# Patient Record
Sex: MALE | Race: WHITE | NOT HISPANIC OR LATINO | Employment: FULL TIME | URBAN - METROPOLITAN AREA
[De-identification: names, ages, dates, MRNs, and addresses within clinical notes are randomized per-mention and may not be internally consistent; named-entity substitution may affect disease eponyms.]

---

## 2017-08-01 ENCOUNTER — TELEPHONE (OUTPATIENT)
Dept: FAMILY MEDICINE CLINIC | Facility: CLINIC | Age: 42
End: 2017-08-01

## 2017-08-01 NOTE — TELEPHONE ENCOUNTER
"----- Message from Nikos Esparza sent at 8/1/2017 11:25 AM EDT -----  Contact: PT  PT IS WANTING TO BE SEEN BY LIO RODRIGUEZ. HE IS A NEW PATIENT INTO THE OFFICE BUT SAYS THAT LIO PERSONALLY KNOWS HIM AND HIS FAMILY. PT STATED THAT HE IS A  AND THAT HIS CHIEF COMPLAINT IS BACK PAIN AND NUMBNESS IN HIS FINGERS. PT IS CONCERNED BECAUSE THE NUMBNESS IS IN HIS \"WEAPON HAND\". I SCHEDULED THE PT FOR A NEW PT APPT WITH LIO NEXT WED. (8/9/2017) AT 9AM. DOES LIO KNOW WHO THIS IS, AND DOES SHE WANT ME TO TRY AND SQUEEZE HIM IN EARLIER? PT WAS WANTING TO BE SEEN TODAY, IF POSSIBLE.  "

## 2017-08-02 ENCOUNTER — OFFICE VISIT (OUTPATIENT)
Dept: FAMILY MEDICINE CLINIC | Facility: CLINIC | Age: 42
End: 2017-08-02

## 2017-08-02 VITALS
DIASTOLIC BLOOD PRESSURE: 86 MMHG | TEMPERATURE: 97.7 F | HEART RATE: 87 BPM | HEIGHT: 74 IN | OXYGEN SATURATION: 98 % | BODY MASS INDEX: 29.39 KG/M2 | WEIGHT: 229 LBS | RESPIRATION RATE: 16 BRPM | SYSTOLIC BLOOD PRESSURE: 136 MMHG

## 2017-08-02 DIAGNOSIS — M54.2 NECK PAIN: ICD-10-CM

## 2017-08-02 DIAGNOSIS — M54.12 CERVICAL RADICULOPATHY: ICD-10-CM

## 2017-08-02 DIAGNOSIS — M79.601 PAIN OF RIGHT UPPER EXTREMITY: Primary | ICD-10-CM

## 2017-08-02 PROCEDURE — 96372 THER/PROPH/DIAG INJ SC/IM: CPT | Performed by: PHYSICIAN ASSISTANT

## 2017-08-02 PROCEDURE — 99203 OFFICE O/P NEW LOW 30 MIN: CPT | Performed by: PHYSICIAN ASSISTANT

## 2017-08-02 RX ORDER — METHOCARBAMOL 500 MG/1
500 TABLET, FILM COATED ORAL 4 TIMES DAILY
Qty: 40 TABLET | Refills: 1 | Status: SHIPPED | OUTPATIENT
Start: 2017-08-02 | End: 2017-08-09 | Stop reason: ALTCHOICE

## 2017-08-02 RX ORDER — PREDNISONE 10 MG/1
10 TABLET ORAL DAILY
COMMUNITY
End: 2017-08-09

## 2017-08-02 NOTE — PROGRESS NOTES
Subjective   Sergei Griffith is a 42 y.o. male    History of Present Illness  Patient resents today as a new patient to our practice to establish care.  I've seen him in the past [place] [facility].  He states that he began having sudden severe pain in his right shoulder blade region on Saturday without any history of trauma.  He states that soon after this radiated down his entire right arm from shoulder to his hand.  He has been experiencing some weakness in his hand  as well as some numbness in his second third and fourth digits of his right hand.  He states the pain is excruciating and sharp.  He has gone his chiropractor couple times without any improvement.  He states he did an x-ray showing some straightening of the spine around the neck.  He was seen by his company physician at [facility] yesterday since he was unable to get into our office and was started on Medrol Dosepak.  The following portions of the patient's history were reviewed and updated as appropriate: allergies, current medications, past social history and problem list    Review of Systems   Constitutional: Positive for activity change.   HENT: Negative for sore throat, trouble swallowing and voice change.    Respiratory: Negative.  Negative for shortness of breath.    Gastrointestinal: Negative.    Genitourinary: Negative.    Musculoskeletal: Positive for arthralgias, back pain, myalgias, neck pain and neck stiffness. Negative for gait problem and joint swelling.   Skin: Negative.  Negative for rash and wound.   Neurological: Positive for weakness and numbness. Negative for dizziness and tremors.   Hematological: Negative for adenopathy.       Objective     Vitals:    08/02/17 1136   BP: 136/86   Pulse: 87   Resp: 16   Temp: 97.7 °F (36.5 °C)   SpO2: 98%       Physical Exam   Constitutional: He is oriented to person, place, and time. He appears well-developed and well-nourished. No distress.   HENT:   Head: Normocephalic and atraumatic.   Neck: No  JVD present. Spinous process tenderness and muscular tenderness present. Decreased range of motion present. No tracheal deviation present. No thyromegaly present.   Cardiovascular: Intact distal pulses.    Pulmonary/Chest: Effort normal and breath sounds normal. No stridor.   Musculoskeletal: He exhibits tenderness ( Patient is tenderness noted at right lateral cervical musculature into right trapezius.  Weakness noted in right upper extremity 3/5 strength versus 5 over 5 left upper extremity). He exhibits no edema or deformity.   Lymphadenopathy:     He has no cervical adenopathy.   Neurological: He is oriented to person, place, and time. He displays normal reflexes. No cranial nerve deficit. He exhibits normal muscle tone. Coordination normal.   Skin: Skin is warm and dry. No rash noted. He is not diaphoretic. No erythema. No pallor.   Nursing note and vitals reviewed.      Assessment/Plan     Diagnoses and all orders for this visit:    Pain of right upper extremity    Neck pain    Cervical radiculopathy    Other orders  -     predniSONE (DELTASONE) 10 MG tablet; Take 10 mg by mouth Daily.  -     methocarbamol (ROBAXIN) 500 MG tablet; Take 1 tablet by mouth 4 (Four) Times a Day. For pain in arm and neck as needed with muscle spasm  Depo-Medrol 40 mg IM given in office.  Advised patient continue on Medrol Dosepak.  Continue with chiropractic treatment if so desired, patient states he wants to continue doing this.  If pain persist after the weekend would recommend physical therapy consultation and/or MRI of C-spine.

## 2017-08-09 ENCOUNTER — TELEPHONE (OUTPATIENT)
Dept: FAMILY MEDICINE CLINIC | Facility: CLINIC | Age: 42
End: 2017-08-09

## 2017-08-09 RX ORDER — MELOXICAM 15 MG/1
15 TABLET ORAL DAILY
Qty: 30 TABLET | Refills: 0 | Status: SHIPPED | OUTPATIENT
Start: 2017-08-09 | End: 2019-07-16

## 2017-08-09 RX ORDER — METAXALONE 800 MG/1
800 TABLET ORAL 4 TIMES DAILY
Qty: 40 TABLET | Refills: 2 | Status: SHIPPED | OUTPATIENT
Start: 2017-08-09 | End: 2019-07-16

## 2017-08-09 NOTE — TELEPHONE ENCOUNTER
I have sent 2 new medications in for him, have him stop his methocarbamol.  Contact me when he wants the MRI scheduled.

## 2017-08-09 NOTE — TELEPHONE ENCOUNTER
Patient isn't feeling any better. [facility] and has seen chiropractor six times. He is interested in the MRI but will be at [facility] [facility] x3 weeks so will call back when done to have it set up. He wants to know if you can change/adjust meds to help with pain.    [phone]

## 2017-08-09 NOTE — TELEPHONE ENCOUNTER
----- Message from Valorie Lopez sent at 8/9/2017  8:21 AM EDT -----  Contact: PT.  PT. SAW LIO LAST Tuesday.  PT. WAS GIVEN A STEROID INJECTION & STILL NOT FEELING ANY BETTER & GIVEN 2 MEDS.:  METHOCARBARMIL, 500 MG., TAKEN 4/DAY & GABAPENTIN, 300 MG., TAKEN @ P.M.  THEY ARE NOT WORKING FOR THE PINCHED NERVE ISSUE; STILL IN SEVERE PAIN.  RX=[facility].  PT. CAN BE REACHED @ #: ABOVE HOME #.

## 2017-08-09 NOTE — TELEPHONE ENCOUNTER
Please call patient, see if he is seen any improvement.  I would recommend we get him into physical therapy also, see how he feels about that.  If pain has not improved at all would recommend an MRI of his back.

## 2017-08-18 RX ORDER — METHYLPREDNISOLONE ACETATE 40 MG/ML
40 INJECTION, SUSPENSION INTRA-ARTICULAR; INTRALESIONAL; INTRAMUSCULAR; SOFT TISSUE ONCE
Status: COMPLETED | OUTPATIENT
Start: 2017-08-18 | End: 2017-08-18

## 2017-08-18 RX ADMIN — METHYLPREDNISOLONE ACETATE 40 MG: 40 INJECTION, SUSPENSION INTRA-ARTICULAR; INTRALESIONAL; INTRAMUSCULAR; SOFT TISSUE at 15:31

## 2017-09-26 ENCOUNTER — TELEPHONE (OUTPATIENT)
Dept: FAMILY MEDICINE CLINIC | Facility: CLINIC | Age: 42
End: 2017-09-26

## 2017-09-26 RX ORDER — FLUTICASONE PROPIONATE 50 MCG
2 SPRAY, SUSPENSION (ML) NASAL DAILY
Qty: 1 BOTTLE | Refills: 11 | Status: SHIPPED | OUTPATIENT
Start: 2017-09-26 | End: 2023-03-28

## 2017-09-26 NOTE — TELEPHONE ENCOUNTER
----- Message from Sergei Griffith sent at 9/22/2017 12:39 PM EDT -----  Regarding: Prescription Question  Contact: [phone]  Is it possible to get a rx for flonase called in for my allergies. I had one from [facility] for my sinus infections to use but I just ran out. Thanks

## 2019-07-16 ENCOUNTER — OFFICE VISIT (OUTPATIENT)
Dept: FAMILY MEDICINE CLINIC | Facility: CLINIC | Age: 44
End: 2019-07-16

## 2019-07-16 VITALS
HEIGHT: 74 IN | TEMPERATURE: 97.7 F | WEIGHT: 240 LBS | DIASTOLIC BLOOD PRESSURE: 90 MMHG | BODY MASS INDEX: 30.8 KG/M2 | SYSTOLIC BLOOD PRESSURE: 146 MMHG | OXYGEN SATURATION: 98 % | HEART RATE: 80 BPM

## 2019-07-16 DIAGNOSIS — I10 ESSENTIAL HYPERTENSION: Primary | ICD-10-CM

## 2019-07-16 PROCEDURE — 99213 OFFICE O/P EST LOW 20 MIN: CPT | Performed by: PHYSICIAN ASSISTANT

## 2019-07-16 PROCEDURE — 93000 ELECTROCARDIOGRAM COMPLETE: CPT | Performed by: PHYSICIAN ASSISTANT

## 2019-07-16 RX ORDER — LISINOPRIL 10 MG/1
10 TABLET ORAL DAILY
Qty: 30 TABLET | Refills: 5 | Status: SHIPPED | OUTPATIENT
Start: 2019-07-16 | End: 2019-10-30 | Stop reason: SDUPTHER

## 2019-07-16 NOTE — PROGRESS NOTES
Subjective   Sergei Griffith is a 44 y.o. male  Elevated Blood Pressure (concerned about intermittent elevated blood pressure x2 months)      History of Present Illness  Patient comes in today concerned about her blood pressure staying elevated over the past several months.  His weight is up higher than he is ever been.  Both of his parents have history of high blood pressure.  He states he is noticed some flushing and pulsing in his neck at times and his pressure gets high.  He finds it hard to tolerate warm weather.  No swelling, no chest pain or shortness of breath no palpitations.  The following portions of the patient's history were reviewed and updated as appropriate: allergies, current medications, past social history and problem list    Review of Systems   Constitutional: Negative for fatigue and unexpected weight change.   Respiratory: Negative for cough, chest tightness and shortness of breath.    Cardiovascular: Negative for chest pain, palpitations and leg swelling.   Gastrointestinal: Negative for nausea.   Skin: Negative for color change and rash.   Neurological: Negative for dizziness, syncope, weakness and headaches.       Objective     Vitals:    07/16/19 0947   BP: 146/90   Pulse: 80   Temp: 97.7 °F (36.5 °C)   SpO2: 98%         Physical Exam   Constitutional: He appears well-developed and well-nourished. No distress.   Neck: No JVD present.   Cardiovascular: Normal rate, regular rhythm, normal heart sounds and intact distal pulses.   No murmur heard.  Pulmonary/Chest: Effort normal and breath sounds normal. No respiratory distress. He exhibits no tenderness.   Abdominal: Soft. He exhibits no distension. There is no tenderness.   Musculoskeletal: He exhibits no edema.   Skin: Skin is warm and dry. He is not diaphoretic. No erythema. No pallor.   Psychiatric: He has a normal mood and affect.   Nursing note and vitals reviewed.      ECG 12 Lead  Date/Time: 7/16/2019 10:46 AM  Performed by: Blues  Mavis DIXON PA-C  Authorized by: Mavis Sandy PA-C   Comparison: not compared with previous ECG   Previous ECG: no previous ECG available  Rhythm: sinus bradycardia  Rate: bradycardic  BPM: 55  ST Segments: ST segments normal  T Waves: T waves normal  QRS axis: normal  Other: no other findings    Clinical impression: normal ECG          Assessment/Plan     Diagnoses and all orders for this visit:    Essential hypertension  -     ECG 12 Lead    Other orders  -     lisinopril (PRINIVIL,ZESTRIL) 10 MG tablet; Take 1 tablet by mouth Daily. For BP    Start on lisinopril as noted above for hypertension uncontrolled new diagnosis, follow-up in 1 month for recheck of blood pressure.

## 2019-08-21 ENCOUNTER — TELEPHONE (OUTPATIENT)
Dept: FAMILY MEDICINE CLINIC | Facility: CLINIC | Age: 44
End: 2019-08-21

## 2019-08-21 NOTE — TELEPHONE ENCOUNTER
Regarding: Prescription Question  Contact: [phone]  ----- Message from Mychart, Generic sent at 8/21/2019  7:56 AM EDT -----    Is it possible to increase the dosage on my bp medication? I've cut out soft drinks and cut back on greasy foods. I've lost about 12 pounds but I still feel bad at times. I'm due for a refill in a couple days and wanted to see if this is possible before I refill. Thanks

## 2019-08-22 NOTE — TELEPHONE ENCOUNTER
Please find out what patient's blood pressure readings are currently to see if he needs to have his blood pressure medication raised.

## 2019-08-22 NOTE — TELEPHONE ENCOUNTER
I do not understand.  I cannot see any other message.  I see what he wrote about wanting to increase the blood pressure medication but it does not say anything at all about what his blood pressure is running.

## 2019-10-30 ENCOUNTER — OFFICE VISIT (OUTPATIENT)
Dept: FAMILY MEDICINE CLINIC | Facility: CLINIC | Age: 44
End: 2019-10-30

## 2019-10-30 ENCOUNTER — LAB (OUTPATIENT)
Dept: LAB | Facility: HOSPITAL | Age: 44
End: 2019-10-30

## 2019-10-30 VITALS
HEIGHT: 74 IN | DIASTOLIC BLOOD PRESSURE: 82 MMHG | OXYGEN SATURATION: 99 % | HEART RATE: 74 BPM | WEIGHT: 234 LBS | TEMPERATURE: 98.2 F | SYSTOLIC BLOOD PRESSURE: 140 MMHG | BODY MASS INDEX: 30.03 KG/M2

## 2019-10-30 DIAGNOSIS — I10 ESSENTIAL HYPERTENSION: Primary | ICD-10-CM

## 2019-10-30 DIAGNOSIS — I10 ESSENTIAL HYPERTENSION: ICD-10-CM

## 2019-10-30 LAB
ANION GAP SERPL CALCULATED.3IONS-SCNC: 14 MMOL/L (ref 5–15)
BUN BLD-MCNC: 11 MG/DL (ref 6–20)
BUN/CREAT SERPL: 7.9 (ref 7–25)
CALCIUM SPEC-SCNC: 9.7 MG/DL (ref 8.6–10.5)
CHLORIDE SERPL-SCNC: 100 MMOL/L (ref 98–107)
CHOLEST SERPL-MCNC: 164 MG/DL (ref 0–200)
CO2 SERPL-SCNC: 26 MMOL/L (ref 22–29)
CREAT BLD-MCNC: 1.4 MG/DL (ref 0.76–1.27)
GFR SERPL CREATININE-BSD FRML MDRD: 55 ML/MIN/1.73
GLUCOSE BLD-MCNC: 86 MG/DL (ref 65–99)
HDLC SERPL-MCNC: 30 MG/DL (ref 40–60)
LDLC SERPL CALC-MCNC: 106 MG/DL (ref 0–100)
LDLC/HDLC SERPL: 3.55 {RATIO}
POTASSIUM BLD-SCNC: 4.8 MMOL/L (ref 3.5–5.2)
SODIUM BLD-SCNC: 140 MMOL/L (ref 136–145)
TRIGL SERPL-MCNC: 138 MG/DL (ref 0–150)
VLDLC SERPL-MCNC: 27.6 MG/DL (ref 5–40)

## 2019-10-30 PROCEDURE — 36415 COLL VENOUS BLD VENIPUNCTURE: CPT

## 2019-10-30 PROCEDURE — 99213 OFFICE O/P EST LOW 20 MIN: CPT | Performed by: PHYSICIAN ASSISTANT

## 2019-10-30 PROCEDURE — 80061 LIPID PANEL: CPT | Performed by: PHYSICIAN ASSISTANT

## 2019-10-30 PROCEDURE — 80048 BASIC METABOLIC PNL TOTAL CA: CPT

## 2019-10-30 RX ORDER — LISINOPRIL 10 MG/1
10 TABLET ORAL DAILY
Qty: 90 TABLET | Refills: 1 | Status: SHIPPED | OUTPATIENT
Start: 2019-10-30 | End: 2020-11-20

## 2019-10-30 NOTE — PROGRESS NOTES
I have reviewed the notes, assessments, and/or procedures performed by Mavis Sandy PA-C, I concur with her documentation of Sergei Griffith.

## 2019-10-30 NOTE — PROGRESS NOTES
Subjective   Sergei Griffith is a 44 y.o. male  Hypertension (Follow up on blood pressure, doing well on medication )      History of Present Illness  Patient comes in today for six-month follow-up of hypertension.  States is feeling better, weight is down, he is improved his diet.  Blood pressure is well controlled.  Denies any adverse effects or medication.  Allergies well-controlled on Flonase OTC and allergy shots twice a week.  He is due for labs but is fasting today.  The following portions of the patient's history were reviewed and updated as appropriate: allergies, current medications, past social history and problem list    Review of Systems   Constitutional: Negative for fatigue and unexpected weight change.   Respiratory: Negative for cough, chest tightness and shortness of breath.    Cardiovascular: Negative for chest pain, palpitations and leg swelling.   Gastrointestinal: Negative for nausea.   Skin: Negative for color change and rash.   Neurological: Negative for dizziness, syncope, weakness and headaches.       Objective     Vitals:    10/30/19 1144   BP: 140/82   Pulse: 74   Temp: 98.2 °F (36.8 °C)   SpO2: 99%       Physical Exam   Constitutional: He appears well-developed and well-nourished. No distress.   Neck: No JVD present.   Cardiovascular: Normal rate, regular rhythm, normal heart sounds and intact distal pulses.   No murmur heard.  Pulmonary/Chest: Effort normal and breath sounds normal. No respiratory distress. He exhibits no tenderness.   Abdominal: Soft. He exhibits no distension. There is no tenderness.   Musculoskeletal: He exhibits no edema.   Skin: Skin is warm and dry. He is not diaphoretic. No erythema. No pallor.   Psychiatric: He has a normal mood and affect.   Nursing note and vitals reviewed.      Assessment/Plan     Diagnoses and all orders for this visit:    Essential hypertension  -     Basic metabolic panel; Future  -     Lipid Panel    Other orders  -     lisinopril  (PRINIVIL,ZESTRIL) 10 MG tablet; Take 1 tablet by mouth Daily. For BP    Follow-up in 6 months and as needed

## 2020-11-20 RX ORDER — LISINOPRIL 10 MG/1
10 TABLET ORAL DAILY
Qty: 30 TABLET | Refills: 0 | Status: SHIPPED | OUTPATIENT
Start: 2020-11-20 | End: 2022-04-11 | Stop reason: SDUPTHER

## 2021-03-11 NOTE — TELEPHONE ENCOUNTER
I would be glad to see this patient in my next standard opening it does not have to be a new patient slot.  I will be glad to see him tomorrow or I would be glad to work him in as a double book today if that is best for him. 08-Mar-2021

## 2022-04-11 ENCOUNTER — LAB (OUTPATIENT)
Dept: LAB | Facility: HOSPITAL | Age: 47
End: 2022-04-11

## 2022-04-11 ENCOUNTER — OFFICE VISIT (OUTPATIENT)
Dept: FAMILY MEDICINE CLINIC | Facility: CLINIC | Age: 47
End: 2022-04-11

## 2022-04-11 VITALS
DIASTOLIC BLOOD PRESSURE: 74 MMHG | SYSTOLIC BLOOD PRESSURE: 134 MMHG | OXYGEN SATURATION: 99 % | TEMPERATURE: 97.2 F | HEIGHT: 74 IN | HEART RATE: 72 BPM | WEIGHT: 239 LBS | BODY MASS INDEX: 30.67 KG/M2

## 2022-04-11 DIAGNOSIS — Z00.00 GENERAL MEDICAL EXAM: Primary | ICD-10-CM

## 2022-04-11 DIAGNOSIS — Z00.00 GENERAL MEDICAL EXAM: ICD-10-CM

## 2022-04-11 DIAGNOSIS — Z87.828 HISTORY OF BACK STRAIN: ICD-10-CM

## 2022-04-11 DIAGNOSIS — Z12.5 PROSTATE CANCER SCREENING: ICD-10-CM

## 2022-04-11 DIAGNOSIS — Z11.59 ENCOUNTER FOR HEPATITIS C SCREENING TEST FOR LOW RISK PATIENT: ICD-10-CM

## 2022-04-11 DIAGNOSIS — Z11.1 SCREENING-PULMONARY TB: ICD-10-CM

## 2022-04-11 DIAGNOSIS — I10 ESSENTIAL HYPERTENSION: ICD-10-CM

## 2022-04-11 DIAGNOSIS — Z12.11 COLON CANCER SCREENING: ICD-10-CM

## 2022-04-11 DIAGNOSIS — J01.80 ACUTE NON-RECURRENT SINUSITIS OF OTHER SINUS: ICD-10-CM

## 2022-04-11 DIAGNOSIS — Z23 IMMUNIZATION DUE: ICD-10-CM

## 2022-04-11 LAB
ANION GAP SERPL CALCULATED.3IONS-SCNC: 11.4 MMOL/L (ref 5–15)
BUN SERPL-MCNC: 11 MG/DL (ref 6–20)
BUN/CREAT SERPL: 9 (ref 7–25)
CALCIUM SPEC-SCNC: 9.6 MG/DL (ref 8.6–10.5)
CHLORIDE SERPL-SCNC: 105 MMOL/L (ref 98–107)
CHOLEST SERPL-MCNC: 154 MG/DL (ref 0–200)
CO2 SERPL-SCNC: 25.6 MMOL/L (ref 22–29)
CREAT SERPL-MCNC: 1.22 MG/DL (ref 0.76–1.27)
DEPRECATED RDW RBC AUTO: 40.1 FL (ref 37–54)
EGFRCR SERPLBLD CKD-EPI 2021: 73.1 ML/MIN/1.73
ERYTHROCYTE [DISTWIDTH] IN BLOOD BY AUTOMATED COUNT: 12.4 % (ref 12.3–15.4)
GLUCOSE SERPL-MCNC: 106 MG/DL (ref 65–99)
HCT VFR BLD AUTO: 47 % (ref 37.5–51)
HCV AB SER DONR QL: NORMAL
HDLC SERPL-MCNC: 27 MG/DL (ref 40–60)
HGB BLD-MCNC: 16.3 G/DL (ref 13–17.7)
LDLC SERPL CALC-MCNC: 106 MG/DL (ref 0–100)
LDLC/HDLC SERPL: 3.84 {RATIO}
MCH RBC QN AUTO: 30.9 PG (ref 26.6–33)
MCHC RBC AUTO-ENTMCNC: 34.7 G/DL (ref 31.5–35.7)
MCV RBC AUTO: 89 FL (ref 79–97)
PLATELET # BLD AUTO: 192 10*3/MM3 (ref 140–450)
PMV BLD AUTO: 11 FL (ref 6–12)
POTASSIUM SERPL-SCNC: 4.1 MMOL/L (ref 3.5–5.2)
PSA SERPL-MCNC: 0.68 NG/ML (ref 0–4)
RBC # BLD AUTO: 5.28 10*6/MM3 (ref 4.14–5.8)
SODIUM SERPL-SCNC: 142 MMOL/L (ref 136–145)
TRIGL SERPL-MCNC: 116 MG/DL (ref 0–150)
VLDLC SERPL-MCNC: 21 MG/DL (ref 5–40)
WBC NRBC COR # BLD: 5.23 10*3/MM3 (ref 3.4–10.8)

## 2022-04-11 PROCEDURE — 86803 HEPATITIS C AB TEST: CPT

## 2022-04-11 PROCEDURE — 90471 IMMUNIZATION ADMIN: CPT | Performed by: PHYSICIAN ASSISTANT

## 2022-04-11 PROCEDURE — 36415 COLL VENOUS BLD VENIPUNCTURE: CPT

## 2022-04-11 PROCEDURE — 80061 LIPID PANEL: CPT

## 2022-04-11 PROCEDURE — 90632 HEPA VACCINE ADULT IM: CPT | Performed by: PHYSICIAN ASSISTANT

## 2022-04-11 PROCEDURE — 99396 PREV VISIT EST AGE 40-64: CPT | Performed by: PHYSICIAN ASSISTANT

## 2022-04-11 PROCEDURE — 80048 BASIC METABOLIC PNL TOTAL CA: CPT

## 2022-04-11 PROCEDURE — 93000 ELECTROCARDIOGRAM COMPLETE: CPT | Performed by: PHYSICIAN ASSISTANT

## 2022-04-11 PROCEDURE — 86480 TB TEST CELL IMMUN MEASURE: CPT

## 2022-04-11 PROCEDURE — 85027 COMPLETE CBC AUTOMATED: CPT

## 2022-04-11 PROCEDURE — G0103 PSA SCREENING: HCPCS

## 2022-04-11 RX ORDER — METHOCARBAMOL 500 MG/1
500 TABLET, FILM COATED ORAL 4 TIMES DAILY
Qty: 40 TABLET | Refills: 2 | Status: SHIPPED | OUTPATIENT
Start: 2022-04-11 | End: 2022-04-18 | Stop reason: ALTCHOICE

## 2022-04-11 RX ORDER — AMOXICILLIN 500 MG/1
500 CAPSULE ORAL 3 TIMES DAILY
Qty: 21 CAPSULE | Refills: 0 | Status: SHIPPED | OUTPATIENT
Start: 2022-04-11 | End: 2023-03-28

## 2022-04-11 RX ORDER — LISINOPRIL 10 MG/1
10 TABLET ORAL DAILY
Qty: 30 TABLET | Refills: 11 | Status: SHIPPED | OUTPATIENT
Start: 2022-04-11 | End: 2023-03-28

## 2022-04-11 NOTE — PROGRESS NOTES
Oksana Griffith is a 47 y.o. male  Annual Exam (Annual physical and labs, not consistent with taking BP medication)      History of Present Illness  Patient presents today for a preventive medical visit.  Patient is here to determine screening labs and tests that are due and to determine immunization status as well.  Patient will be counseled regarding preventative medicine issues such as regular exercise and healthy diet as well.    Patient is a 46-year-old male seen today for an annual physical and follow-up on hypertension, which is well controlled on lisinopril.    The patient notes his blood pressure has improved, although he does not take his lisinopril as regularly as he should.    The patient notes a history of occasional low back pain. He states he has not had any symptoms in the past few years, but when he does, he goes to his chiropractor. The patient notes he has gained some weight and thinks this may be due to his occasional back pain. He notes he is still able to run and do calisthenics but sometimes has low back pain after a run. He denies ever going to a physical therapist in the past.    The patient notes a history of seasonal allergies. He states he receives shots, take antihistamines, and nasal sprays occasionally, as prescribed by his allergist, Dr. Gordon. The patient notes his seasonal allergies has been worsening recently and complains of green colored nasal discharge.    The patient notes he is employed and needs physical paperwork filled out for work. He states he is scheduled for an eye exam this week and only uses reading glasses. He states his digestion, bowel movements, and kidneys are normal. He denies any history of hernias or other ongoing joint/muscle issues. He denies every having a colon cancer screening or family history of colon cancer, polyps, or prostate cancer. The patient has not eaten today.    The patient notes a family history of both his parents having  hypertension. He notes on his paternal side, many people have passed from myocardial infarctions, although the patient's father passed from COVID and pneumonia, not myocardial infarction.         The following portions of the patient's history were reviewed and updated as appropriate: allergies, current medications, past social history and problem list    Review of Systems   Constitutional: Negative.  Negative for chills, fatigue and fever.   HENT: Positive for congestion, postnasal drip, rhinorrhea and sinus pressure. Negative for ear pain and sore throat.    Eyes: Negative.  Negative for pain.   Respiratory: Negative.  Negative for cough and shortness of breath.    Cardiovascular: Negative.    Gastrointestinal: Negative.    Endocrine: Negative.    Genitourinary: Negative.    Musculoskeletal: Positive for back pain ( occasional, none today).   Skin: Negative.    Allergic/Immunologic: Negative.    Neurological: Negative.  Negative for dizziness and headaches.   Hematological: Negative.  Negative for adenopathy.   Psychiatric/Behavioral: Negative.    All other systems reviewed and are negative.      Objective     Vitals:    04/11/22 1022   BP: 134/74   Pulse: 72   Temp: 97.2 °F (36.2 °C)   SpO2: 99%       Physical Exam  Vitals and nursing note reviewed.   Constitutional:       General: He is not in acute distress.     Appearance: Normal appearance. He is well-developed. He is not ill-appearing, toxic-appearing or diaphoretic.   HENT:      Head: Normocephalic and atraumatic.      Right Ear: External ear normal.      Left Ear: External ear normal.   Eyes:      Conjunctiva/sclera: Conjunctivae normal.      Pupils: Pupils are equal, round, and reactive to light.   Neck:      Thyroid: No thyromegaly.      Vascular: No carotid bruit.   Cardiovascular:      Rate and Rhythm: Normal rate and regular rhythm.      Pulses: Normal pulses.      Heart sounds: Normal heart sounds. No murmur heard.  Pulmonary:      Effort: Pulmonary  effort is normal. No respiratory distress.      Breath sounds: Normal breath sounds.   Abdominal:      General: Bowel sounds are normal.      Palpations: Abdomen is soft. There is no mass.      Tenderness: There is no abdominal tenderness.   Musculoskeletal:         General: No swelling. Normal range of motion.      Cervical back: Normal range of motion and neck supple.   Lymphadenopathy:      Cervical: No cervical adenopathy.   Skin:     General: Skin is warm and dry.      Findings: No lesion or rash.   Neurological:      Mental Status: He is alert and oriented to person, place, and time.      Cranial Nerves: No cranial nerve deficit.      Sensory: No sensory deficit.      Motor: No weakness.      Coordination: Coordination normal.      Gait: Gait normal.      Deep Tendon Reflexes: Reflexes are normal and symmetric.   Psychiatric:         Mood and Affect: Mood normal.         Behavior: Behavior normal.         Thought Content: Thought content normal.         Judgment: Judgment normal.       ECG 12 Lead    Date/Time: 4/11/2022 11:08 AM  Performed by: Mavis Sandy PA-C  Authorized by: Mavis Sandy PA-C   Comparison: not compared with previous ECG   Rhythm: sinus rhythm  Rate: normal  BPM: 62  Conduction: conduction normal  ST Segments: ST segments normal  T Waves: T waves normal  QRS axis: normal  Other: no other findings    Clinical impression: normal ECG            Discussed preventative medicine issues with patient including regular exercise, healthy diet, stress reduction, adequate sleep and recommended age-appropriate screening studies.  Assessment/Plan     Diagnoses and all orders for this visit:    1. General medical exam (Primary)  -     Cologuard - Stool, Per Rectum; Future  -     Hepatitis C Antibody; Future  -     Lipid Panel; Future  -     Basic metabolic panel; Future  -     CBC (No Diff); Future    2. Immunization due  -     Hepatitis A Vaccine Adult IM    3. Colon cancer screening  -      Cologuard - Stool, Per Rectum; Future    4. Encounter for hepatitis C screening test for low risk patient  -     Hepatitis C Antibody; Future    5. Prostate cancer screening  -     PSA Screen; Future    6. Essential hypertension    7. Acute non-recurrent sinusitis of other sinus    8. Screening-pulmonary TB  -     QuantiFERON-TB Gold Plus; Future    9. History of back strain    Other orders  -     methocarbamol (Robaxin) 500 MG tablet; Take 1 tablet by mouth 4 (Four) Times a Day. As needed for muscle spasm  Dispense: 40 tablet; Refill: 2  -     amoxicillin (AMOXIL) 500 MG capsule; Take 1 capsule by mouth 3 (Three) Times a Day.  Dispense: 21 capsule; Refill: 0  -     ECG 12 Lead  -     lisinopril (PRINIVIL,ZESTRIL) 10 MG tablet; Take 1 tablet by mouth Daily.  Dispense: 30 tablet; Refill: 11         - His blood pressure is well controlled on lisinopril.  - He will continue on his current medication regimen.      - He will continue to follow up with his chiropractor.  - If his back is bothering him severely or routinely more than occasionally, he will be referred to a physical therapist.  - I informed the patient that weight can make a difference on the spine and suggested for him to lose weight, by reducing calories in his diet, to see if this improves his back pain.      - He will continue to take his medications prescribed by his allergist.  - He will be prescribed an antibiotic for the next week.      - I will order routine labs, including PSA, tuberculosis test, and Cologuard.  - I will order Tdap and hepatitis A vaccines.      Transcribed from ambient dictation for Mavis Sandy PA-C by Ellyn Watson.  04/11/22   13:43 EDT    Patient verbalized consent to the visit recording.

## 2022-04-13 LAB
GAMMA INTERFERON BACKGROUND BLD IA-ACNC: 0.09 IU/ML
M TB IFN-G BLD-IMP: NEGATIVE
M TB IFN-G CD4+ BCKGRND COR BLD-ACNC: 0.09 IU/ML
M TB IFN-G CD4+CD8+ BCKGRND COR BLD-ACNC: 0.08 IU/ML
MITOGEN IGNF BLD-ACNC: >10 IU/ML
QUANTIFERON INCUBATION: NORMAL
SERVICE CMNT-IMP: NORMAL

## 2022-04-18 ENCOUNTER — TELEPHONE (OUTPATIENT)
Dept: FAMILY MEDICINE CLINIC | Facility: CLINIC | Age: 47
End: 2022-04-18

## 2022-04-18 RX ORDER — TIZANIDINE 2 MG/1
2 TABLET ORAL NIGHTLY PRN
Qty: 30 TABLET | Refills: 2 | Status: SHIPPED | OUTPATIENT
Start: 2022-04-18 | End: 2023-03-28

## 2022-04-18 NOTE — TELEPHONE ENCOUNTER
PATIENT HAS CALLED AND STATED HE WAS SEEN IN OFFICE LAST WEEK AND THAT methocarbamol (Robaxin) 500 MG tablet WAS PRESCRIBED FOR HIS BACK. PATIENT STATES MEDICATION IS NOT WORKING AND IS REQUESTING IF AN ALTERNATIVE MEDICATION COULD BE CALLED INTO PHARMACY.    PATIENT USES THE [facility] IN [place].    PATIENT IS REQUESTING A CALL BACK EITHER WAY WITH DECISION.    CALL BACK NUMBER [phone]

## 2022-04-19 ENCOUNTER — TELEPHONE (OUTPATIENT)
Dept: FAMILY MEDICINE CLINIC | Facility: CLINIC | Age: 47
End: 2022-04-19

## 2022-04-19 DIAGNOSIS — S39.012D STRAIN OF LUMBAR REGION, SUBSEQUENT ENCOUNTER: Primary | ICD-10-CM

## 2022-04-19 RX ORDER — METHYLPREDNISOLONE 4 MG/1
TABLET ORAL
Qty: 1 EACH | Refills: 0 | Status: SHIPPED | OUTPATIENT
Start: 2022-04-19 | End: 2023-03-28

## 2022-04-19 NOTE — TELEPHONE ENCOUNTER
----- Message from Sergei Griffith sent at 4/19/2022  2:19 PM EDT -----  Regarding: Medication   I’m still having alot of trouble with my back and the medication that I picked up yesterday isn’t helping either is it possible to try 2 of those at a time instead of one or to try something else. Sorry just trying to get some type of relief. Thank you!

## 2022-06-14 ENCOUNTER — CLINICAL SUPPORT (OUTPATIENT)
Dept: FAMILY MEDICINE CLINIC | Facility: CLINIC | Age: 47
End: 2022-06-14

## 2022-06-14 DIAGNOSIS — Z23 IMMUNIZATION DUE: Primary | ICD-10-CM

## 2023-03-28 ENCOUNTER — LAB (OUTPATIENT)
Dept: FAMILY MEDICINE CLINIC | Facility: CLINIC | Age: 48
End: 2023-03-28
Payer: COMMERCIAL

## 2023-03-28 ENCOUNTER — OFFICE VISIT (OUTPATIENT)
Dept: FAMILY MEDICINE CLINIC | Facility: CLINIC | Age: 48
End: 2023-03-28
Payer: COMMERCIAL

## 2023-03-28 VITALS
HEART RATE: 76 BPM | DIASTOLIC BLOOD PRESSURE: 68 MMHG | WEIGHT: 206 LBS | BODY MASS INDEX: 26.44 KG/M2 | SYSTOLIC BLOOD PRESSURE: 130 MMHG | HEIGHT: 74 IN | TEMPERATURE: 98.1 F | OXYGEN SATURATION: 98 %

## 2023-03-28 DIAGNOSIS — R61 NIGHT SWEATS: ICD-10-CM

## 2023-03-28 DIAGNOSIS — R50.9 FEVER, UNSPECIFIED FEVER CAUSE: ICD-10-CM

## 2023-03-28 DIAGNOSIS — R63.4 UNEXPLAINED WEIGHT LOSS: ICD-10-CM

## 2023-03-28 DIAGNOSIS — Z12.5 PROSTATE CANCER SCREENING: ICD-10-CM

## 2023-03-28 DIAGNOSIS — R52 BODY ACHES: Primary | ICD-10-CM

## 2023-03-28 DIAGNOSIS — R53.83 FATIGUE, UNSPECIFIED TYPE: ICD-10-CM

## 2023-03-28 DIAGNOSIS — R52 BODY ACHES: ICD-10-CM

## 2023-03-28 LAB
EXPIRATION DATE: NORMAL
FLUAV AG UPPER RESP QL IA.RAPID: NOT DETECTED
FLUBV AG UPPER RESP QL IA.RAPID: NOT DETECTED
HETEROPH AB SER QL LA: NEGATIVE
INTERNAL CONTROL: NORMAL
Lab: NORMAL
SARS-COV-2 AG UPPER RESP QL IA.RAPID: NOT DETECTED

## 2023-03-28 PROCEDURE — 85025 COMPLETE CBC W/AUTO DIFF WBC: CPT | Performed by: PHYSICIAN ASSISTANT

## 2023-03-28 PROCEDURE — 86308 HETEROPHILE ANTIBODY SCREEN: CPT | Performed by: PHYSICIAN ASSISTANT

## 2023-03-28 PROCEDURE — 84443 ASSAY THYROID STIM HORMONE: CPT | Performed by: PHYSICIAN ASSISTANT

## 2023-03-28 PROCEDURE — G0103 PSA SCREENING: HCPCS | Performed by: PHYSICIAN ASSISTANT

## 2023-03-28 PROCEDURE — 87428 SARSCOV & INF VIR A&B AG IA: CPT | Performed by: PHYSICIAN ASSISTANT

## 2023-03-28 PROCEDURE — 80053 COMPREHEN METABOLIC PANEL: CPT | Performed by: PHYSICIAN ASSISTANT

## 2023-03-28 PROCEDURE — 99214 OFFICE O/P EST MOD 30 MIN: CPT | Performed by: PHYSICIAN ASSISTANT

## 2023-03-28 PROCEDURE — 84439 ASSAY OF FREE THYROXINE: CPT | Performed by: PHYSICIAN ASSISTANT

## 2023-03-28 NOTE — PROGRESS NOTES
Subjective   Carlita Griffith is a 48 y.o. male  Fatigue (Increased fatigue and low energy and body aches x2 weeks ), Fever (Fever and night sweats x2 weeks ), and Weight Loss (Concerned about abnormal weight loss)      History of Present Illness    CARLITA GRIFFITH, date of birth 1975, presents today to discuss fever and night sweats for the last couple of weeks and feeling tired.    The patient reports that he has been feeling feverish for 2 weeks. He will get a shower at night, which he normally does not, but it helps him relax, then he will wake up drenched. He will break out in sweats for no reason during the day, but he is not drenched in his clothes. He checks his temperature with a thermometer, and it is usually okay. He denies drinking alcohol daily. His legs and arms have been sore for the last couple of weeks. He denies any lymph nodes. He has an intermittent sore throat on the right side. He denies any pus in his throat. He denies any green mucus or coughing. He denies any shortness of breath or respiratory symptoms. He wakes up with horrible headaches in the mornings but if he gets in the shower and gets fresh air and moving around, they go away. He does not have any energy at all. He denies any blood in his urine or stool. He denies any diarrhea or vomiting. His appetite is good, but he is concerned. The patient reports that he was down to 213 pounds after the [employer], but now he is down to 206 pounds. He states that he does not feel like he is eating as much as usual. He denies any family history of hyperthyroidism. He states that he did the Cologuard test, and it came back clear. He denies any weird looking moles or rashes.    The following portions of the patient's history were reviewed and updated as appropriate: allergies, current medications, past social history and problem list    Review of Systems   Constitutional: Positive for chills, diaphoresis, fatigue, fever and unexpected weight  change. Negative for activity change and appetite change.   HENT: Positive for sore throat. Negative for congestion and trouble swallowing.    Eyes: Negative.    Respiratory: Negative.    Cardiovascular: Negative.    Gastrointestinal: Negative.    Genitourinary: Negative.    Musculoskeletal: Positive for myalgias.   Skin: Negative.    Allergic/Immunologic: Negative.    Neurological: Positive for headaches.   Hematological: Negative for adenopathy.       Objective     Vitals:    03/28/23 1349   BP: 130/68   Pulse: 76   Temp: 98.1 °F (36.7 °C)   SpO2: 98%       Physical Exam  Vitals and nursing note reviewed.   Constitutional:       General: He is not in acute distress.     Appearance: Normal appearance. He is well-developed and normal weight. He is not ill-appearing, toxic-appearing or diaphoretic.   HENT:      Head: Normocephalic and atraumatic.      Right Ear: External ear normal.      Left Ear: External ear normal.      Mouth/Throat:      Lips: Pink.      Mouth: Mucous membranes are dry.      Dentition: Normal dentition.      Tongue: No lesions.      Palate: No mass and lesions.      Pharynx: Oropharynx is clear.      Tonsils: No tonsillar exudate or tonsillar abscesses.   Eyes:      Conjunctiva/sclera: Conjunctivae normal.      Pupils: Pupils are equal, round, and reactive to light.   Neck:      Thyroid: No thyromegaly.      Vascular: No carotid bruit.   Cardiovascular:      Rate and Rhythm: Normal rate and regular rhythm.      Pulses: Normal pulses.      Heart sounds: Normal heart sounds. No murmur heard.  Pulmonary:      Effort: Pulmonary effort is normal. No respiratory distress.      Breath sounds: Normal breath sounds.   Abdominal:      General: Bowel sounds are normal.      Palpations: Abdomen is soft. There is no mass.      Tenderness: There is no abdominal tenderness.   Musculoskeletal:         General: No swelling. Normal range of motion.      Cervical back: Normal range of motion and neck supple. No  rigidity.   Lymphadenopathy:      Cervical: No cervical adenopathy.   Skin:     General: Skin is warm and dry.      Findings: No bruising, lesion or rash.   Neurological:      Mental Status: He is alert and oriented to person, place, and time.      Cranial Nerves: No cranial nerve deficit.      Sensory: No sensory deficit.      Motor: No weakness.      Coordination: Coordination normal.      Gait: Gait normal.      Deep Tendon Reflexes: Reflexes are normal and symmetric.   Psychiatric:         Mood and Affect: Mood normal.         Speech: Speech normal.         Behavior: Behavior normal.         Thought Content: Thought content normal.         Judgment: Judgment normal.         Assessment & Plan     Diagnoses and all orders for this visit:    1. Body aches (Primary)  -     POCT SARS-CoV-2 Antigen POLI + Flu  -     CBC & Differential; Future  -     TSH; Future  -     T4, Free; Future  -     Comprehensive metabolic panel; Future  -     Mononucleosis Screen; Future    2. Fatigue, unspecified type  -     POCT SARS-CoV-2 Antigen POLI + Flu  -     CBC & Differential; Future  -     TSH; Future  -     T4, Free; Future  -     Comprehensive metabolic panel; Future  -     Mononucleosis Screen; Future    3. Fever, unspecified fever cause  -     POCT SARS-CoV-2 Antigen POLI + Flu  -     CBC & Differential; Future  -     TSH; Future  -     T4, Free; Future  -     Comprehensive metabolic panel; Future  -     Mononucleosis Screen; Future    4. Unexplained weight loss  -     CBC & Differential; Future  -     TSH; Future  -     T4, Free; Future  -     Comprehensive metabolic panel; Future  -     Mononucleosis Screen; Future    5. Night sweats  -     CBC & Differential; Future  -     TSH; Future  -     T4, Free; Future  -     Comprehensive metabolic panel; Future  -     Mononucleosis Screen; Future    6. Prostate cancer screening  -     PSA Screen; Future       1. Night sweats  - We will obtain blood work today to check his thyroid, PSA,  diabetes, kidneys, liver, and electrolytes, and will call the patient with the results.    Transcribed from ambient dictation for Mavis Sandy PA-C by Claudia Mcdonald.  03/29/23   10:20 EDT    Patient or patient representative verbalized consent to the visit recording.  I have personally performed the services described in this document as transcribed by the above individual, and it is both accurate and complete.  Mavis Sandy PA-C  3/29/2023  10:37 EDT

## 2023-03-29 ENCOUNTER — TELEPHONE (OUTPATIENT)
Dept: FAMILY MEDICINE CLINIC | Facility: CLINIC | Age: 48
End: 2023-03-29
Payer: COMMERCIAL

## 2023-03-29 DIAGNOSIS — R94.6 HYPERTHYROIDISM DETERMINED BY THYROID FUNCTION TEST: Primary | ICD-10-CM

## 2023-03-29 DIAGNOSIS — E05.90 HYPERTHYROIDISM DETERMINED BY THYROID FUNCTION TEST: Primary | ICD-10-CM

## 2023-03-29 LAB
ALBUMIN SERPL-MCNC: 4.5 G/DL (ref 3.5–5.2)
ALBUMIN/GLOB SERPL: 2 G/DL
ALP SERPL-CCNC: 96 U/L (ref 39–117)
ALT SERPL W P-5'-P-CCNC: 9 U/L (ref 1–41)
ANION GAP SERPL CALCULATED.3IONS-SCNC: 16.4 MMOL/L (ref 5–15)
AST SERPL-CCNC: 10 U/L (ref 1–40)
BASOPHILS # BLD AUTO: 0.06 10*3/MM3 (ref 0–0.2)
BASOPHILS NFR BLD AUTO: 0.9 % (ref 0–1.5)
BILIRUB SERPL-MCNC: 0.6 MG/DL (ref 0–1.2)
BUN SERPL-MCNC: 13 MG/DL (ref 6–20)
BUN/CREAT SERPL: 13 (ref 7–25)
CALCIUM SPEC-SCNC: 9.9 MG/DL (ref 8.6–10.5)
CHLORIDE SERPL-SCNC: 99 MMOL/L (ref 98–107)
CO2 SERPL-SCNC: 22.6 MMOL/L (ref 22–29)
CREAT SERPL-MCNC: 1 MG/DL (ref 0.76–1.27)
DEPRECATED RDW RBC AUTO: 34.7 FL (ref 37–54)
EGFRCR SERPLBLD CKD-EPI 2021: 92.3 ML/MIN/1.73
EOSINOPHIL # BLD AUTO: 0.02 10*3/MM3 (ref 0–0.4)
EOSINOPHIL NFR BLD AUTO: 0.3 % (ref 0.3–6.2)
ERYTHROCYTE [DISTWIDTH] IN BLOOD BY AUTOMATED COUNT: 11.2 % (ref 12.3–15.4)
GLOBULIN UR ELPH-MCNC: 2.3 GM/DL
GLUCOSE SERPL-MCNC: 93 MG/DL (ref 65–99)
HCT VFR BLD AUTO: 39.8 % (ref 37.5–51)
HGB BLD-MCNC: 14.1 G/DL (ref 13–17.7)
IMM GRANULOCYTES # BLD AUTO: 0.02 10*3/MM3 (ref 0–0.05)
IMM GRANULOCYTES NFR BLD AUTO: 0.3 % (ref 0–0.5)
LYMPHOCYTES # BLD AUTO: 1.11 10*3/MM3 (ref 0.7–3.1)
LYMPHOCYTES NFR BLD AUTO: 17.1 % (ref 19.6–45.3)
MCH RBC QN AUTO: 30.2 PG (ref 26.6–33)
MCHC RBC AUTO-ENTMCNC: 35.4 G/DL (ref 31.5–35.7)
MCV RBC AUTO: 85.2 FL (ref 79–97)
MONOCYTES # BLD AUTO: 0.76 10*3/MM3 (ref 0.1–0.9)
MONOCYTES NFR BLD AUTO: 11.7 % (ref 5–12)
NEUTROPHILS NFR BLD AUTO: 4.53 10*3/MM3 (ref 1.7–7)
NEUTROPHILS NFR BLD AUTO: 69.7 % (ref 42.7–76)
NRBC BLD AUTO-RTO: 0 /100 WBC (ref 0–0.2)
PLATELET # BLD AUTO: 321 10*3/MM3 (ref 140–450)
PMV BLD AUTO: 10.7 FL (ref 6–12)
POTASSIUM SERPL-SCNC: 4.3 MMOL/L (ref 3.5–5.2)
PROT SERPL-MCNC: 6.8 G/DL (ref 6–8.5)
PSA SERPL-MCNC: 0.9 NG/ML (ref 0–4)
RBC # BLD AUTO: 4.67 10*6/MM3 (ref 4.14–5.8)
SODIUM SERPL-SCNC: 138 MMOL/L (ref 136–145)
T4 FREE SERPL-MCNC: 4.45 NG/DL (ref 0.93–1.7)
TSH SERPL DL<=0.05 MIU/L-ACNC: <0.005 UIU/ML (ref 0.27–4.2)
WBC NRBC COR # BLD: 6.5 10*3/MM3 (ref 3.4–10.8)

## 2023-03-29 RX ORDER — BUSPIRONE HYDROCHLORIDE 10 MG/1
10 TABLET ORAL 2 TIMES DAILY
Qty: 60 TABLET | Refills: 5 | Status: SHIPPED | OUTPATIENT
Start: 2023-03-29

## 2023-03-29 NOTE — TELEPHONE ENCOUNTER
I can prescribe a medication for anxiety or depression if he is experiencing that.  Please let him know about his results of the hyperthyroidism which certainly could make him feel more anxious and getting this treated would be my primary concern I have put in an urgent referral to endocrinology.

## 2023-03-29 NOTE — TELEPHONE ENCOUNTER
Yes thank you I did see that and they called this morning and I’m scheduled to see them on Monday. If you don’t mind would you maybe just send in something to help with anxiety for me to try just when needed. Thank you for all your help.

## 2023-03-29 NOTE — TELEPHONE ENCOUNTER
----- Message from Sergei Griffith sent at 3/29/2023 11:32 AM EDT -----  Regarding: Question  Contact: [phone]  Sorry I meant to ask yesterday at my appointment but is there anything I can do or try that can help me with stress? Just wanted to get your suggestion on that. Thank you

## 2023-04-03 ENCOUNTER — OFFICE VISIT (OUTPATIENT)
Dept: ENDOCRINOLOGY | Facility: CLINIC | Age: 48
End: 2023-04-03
Payer: COMMERCIAL

## 2023-04-03 VITALS
HEIGHT: 74 IN | SYSTOLIC BLOOD PRESSURE: 152 MMHG | BODY MASS INDEX: 26.44 KG/M2 | OXYGEN SATURATION: 98 % | HEART RATE: 75 BPM | WEIGHT: 206 LBS | DIASTOLIC BLOOD PRESSURE: 80 MMHG

## 2023-04-03 DIAGNOSIS — E05.90 HYPERTHYROIDISM: Primary | ICD-10-CM

## 2023-04-03 LAB
T3 SERPL-MCNC: 187 NG/DL (ref 80–200)
T4 FREE SERPL-MCNC: 2.86 NG/DL (ref 0.93–1.7)
TSH SERPL DL<=0.05 MIU/L-ACNC: 0.01 UIU/ML (ref 0.27–4.2)

## 2023-04-03 PROCEDURE — 86376 MICROSOMAL ANTIBODY EACH: CPT | Performed by: INTERNAL MEDICINE

## 2023-04-03 PROCEDURE — 83520 IMMUNOASSAY QUANT NOS NONAB: CPT | Performed by: INTERNAL MEDICINE

## 2023-04-03 PROCEDURE — 84480 ASSAY TRIIODOTHYRONINE (T3): CPT | Performed by: INTERNAL MEDICINE

## 2023-04-03 PROCEDURE — 99203 OFFICE O/P NEW LOW 30 MIN: CPT | Performed by: INTERNAL MEDICINE

## 2023-04-03 PROCEDURE — 36415 COLL VENOUS BLD VENIPUNCTURE: CPT | Performed by: INTERNAL MEDICINE

## 2023-04-03 PROCEDURE — 84439 ASSAY OF FREE THYROXINE: CPT | Performed by: INTERNAL MEDICINE

## 2023-04-03 PROCEDURE — 84443 ASSAY THYROID STIM HORMONE: CPT | Performed by: INTERNAL MEDICINE

## 2023-04-03 PROCEDURE — 84445 ASSAY OF TSI GLOBULIN: CPT | Performed by: INTERNAL MEDICINE

## 2023-04-03 RX ORDER — ATENOLOL 25 MG/1
25 TABLET ORAL DAILY
Qty: 30 TABLET | Refills: 5 | Status: SHIPPED | OUTPATIENT
Start: 2023-04-03 | End: 2024-04-02

## 2023-04-03 NOTE — PROGRESS NOTES
Chief Complaint   Patient presents with   • Hyperthyroidism        Referring Provider  Mavis MISHRA   Sergei Griffith is a 48 y.o. male had concerns including Hyperthyroidism.     New pt here from PCP referral for newly diagnosed hyperthyroidism. Free T4 > 4. Has weight loss and night sweats. Symptoms started in some aspect in January. 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   Worsening symptoms in March - soreness in his neck, night sweats, weakness, myalgia.    From September to December last year was at [employer] and lost 17 lbs with that - associated with increase in activity. He hasn't been exercising recently and still losing weight.    No palpitations or tachycardia, sleep is variable, no tremor, not so much heat intolerance but night sweats, some anxiety - could be work related.     No prior known history of thyroid disorder. No family history of thyroid disorder. Hasn't had COVID or other illness. No supplements.   In the last month most noted symptom is decrease in energy and fatigue.       Past Medical History:   Diagnosis Date   • Allergic    • Hypertension    • Hyperthyroidism      History reviewed. No pertinent surgical history.   Family History   Problem Relation Age of Onset   • Hypertension Mother    • Hypertension Father       Social History     Socioeconomic History   • Marital status:    Tobacco Use   • Smoking status: Never   • Smokeless tobacco: Never   Vaping Use   • Vaping Use: Never used   Substance and Sexual Activity   • Sexual activity: Yes     Partners: Female      Allergies   Allergen Reactions   • Azithromycin Other (See Comments)     Yeast infection      Current Outpatient Medications on File Prior to Visit   Medication Sig Dispense Refill   • busPIRone (BUSPAR) 10 MG tablet Take 1 tablet by mouth 2 (Two) Times a Day. For anxiety 60 tablet 5     No current facility-administered medications on file prior to visit.        Review of Systems   Constitutional:  "Positive for activity change, fatigue and unexpected weight loss.        Night sweats   HENT: Negative.    Respiratory: Negative.    Cardiovascular: Negative for chest pain and palpitations.   Endocrine:        See HPI   Genitourinary: Negative.    Musculoskeletal: Positive for myalgias.   Skin: Negative.    Allergic/Immunologic: Negative.    Neurological: Positive for weakness.   Hematological: Negative.    Psychiatric/Behavioral: Positive for sleep disturbance and stress. The patient is nervous/anxious.         /80   Pulse 75   Ht 188 cm (74\")   Wt 93.4 kg (206 lb)   SpO2 98%   BMI 26.45 kg/m²      Physical Exam    Constitutional:  well developed; well nourished  no acute distress   ENT/Thyroid: no thyromegaly  no palpable nodules   Eyes: EOM intact  Conjunctiva: clear   Respiratory:  breathing is unlabored  clear to auscultation bilaterally   Cardiovascular:  regular rate and rhythm, S1, S2 normal, no murmur, click, rub or gallop   Chest:  Not performed.   Abdomen: Not performed.   : Not performed.   Musculoskeletal: negative findings:  ROM of all joints is normal, no deformities present   Skin: dry and warm   Neuro: normal without focal findings and mental status, speech normal, alert and oriented x3   Psych: oriented to time, place and person, mood and affect are within normal limits     Labs/Imaging  CMP  Lab Results   Component Value Date    GLUCOSE 93 03/28/2023    BUN 13 03/28/2023    CREATININE 1.00 03/28/2023    EGFRIFNONA 55 (L) 10/30/2019    BCR 13.0 03/28/2023    K 4.3 03/28/2023    CO2 22.6 03/28/2023    CALCIUM 9.9 03/28/2023    ALBUMIN 4.5 03/28/2023    AST 10 03/28/2023    ALT 9 03/28/2023        CBC w/DIFF   Lab Results   Component Value Date    WBC 6.50 03/28/2023    RBC 4.67 03/28/2023    HGB 14.1 03/28/2023    HCT 39.8 03/28/2023    MCV 85.2 03/28/2023    MCH 30.2 03/28/2023    MCHC 35.4 03/28/2023    RDW 11.2 (L) 03/28/2023    RDWSD 34.7 (L) 03/28/2023    MPV 10.7 03/28/2023    "  03/28/2023    NEUTRORELPCT 69.7 03/28/2023    LYMPHORELPCT 17.1 (L) 03/28/2023    MONORELPCT 11.7 03/28/2023    EOSRELPCT 0.3 03/28/2023    BASORELPCT 0.9 03/28/2023    AUTOIGPER 0.3 03/28/2023    NEUTROABS 4.53 03/28/2023    LYMPHSABS 1.11 03/28/2023    MONOSABS 0.76 03/28/2023    EOSABS 0.02 03/28/2023    BASOSABS 0.06 03/28/2023    AUTOIGNUM 0.02 03/28/2023    NRBC 0.0 03/28/2023       TSH  Lab Results   Component Value Date    TSH <0.005 (L) 03/28/2023       T4  Lab Results   Component Value Date    FREET4 4.45 (H) 03/28/2023         Assessment and Plan    Diagnoses and all orders for this visit:    1. Hyperthyroidism (Primary)  Newly diagnosed hyperthyroidism. With weakness, 30 lb mostly unintentional weight loss, neck tenderness and dysphagia. No goiter, no recent illness, no supplements or known family or personal history of thyroid disorder.   T4 is quite high suggestive of autoimmune thyroid disease (Graves) but clinically appears more consistent with thyroiditis which is self limited. Also could be toxic nodule, less likely as no nodules palpable on exam.   Recheck TFTs today with antibodies.   Script sent for atenolol to help with symptoms.   Methimazole will be prescribed if appropriate (if consistent with Graves).   Follow-up in 3 months. Labs again in 6 weeks if methimazole is started.  -     T4, Free  -     TSH  -     Thyroid Peroxidase Antibody  -     Thyroid Stimulating Immunoglobulin  -     Thyrotropin Receptor Antibody  -     T3         Return in about 3 months (around 7/3/2023) for next scheduled follow up. The patient was instructed to contact the clinic with any interval questions or concerns.    Denise Cannon, DO   Endocrinologist    Please note that portions of this note were completed with a voice recognition program.

## 2023-04-04 DIAGNOSIS — E05.90 HYPERTHYROIDISM: Primary | ICD-10-CM

## 2023-04-04 LAB — THYROPEROXIDASE AB SERPL-ACNC: <9 IU/ML (ref 0–34)

## 2023-04-05 LAB — TSI SER-ACNC: <0.1 IU/L (ref 0–0.55)

## 2023-04-07 LAB — TSH RECEP AB SER-ACNC: <1.1 IU/L (ref 0–1.75)
